# Patient Record
Sex: MALE | Race: WHITE | HISPANIC OR LATINO | Employment: FULL TIME | URBAN - METROPOLITAN AREA
[De-identification: names, ages, dates, MRNs, and addresses within clinical notes are randomized per-mention and may not be internally consistent; named-entity substitution may affect disease eponyms.]

---

## 2023-02-07 ENCOUNTER — OFFICE VISIT (OUTPATIENT)
Dept: FAMILY MEDICINE CLINIC | Facility: CLINIC | Age: 48
End: 2023-02-07
Payer: COMMERCIAL

## 2023-02-07 VITALS
SYSTOLIC BLOOD PRESSURE: 116 MMHG | DIASTOLIC BLOOD PRESSURE: 70 MMHG | WEIGHT: 204 LBS | OXYGEN SATURATION: 99 % | RESPIRATION RATE: 18 BRPM | HEART RATE: 89 BPM

## 2023-02-07 DIAGNOSIS — E34.9 HORMONE IMBALANCE: ICD-10-CM

## 2023-02-07 DIAGNOSIS — Z51.81 THERAPEUTIC DRUG MONITORING: ICD-10-CM

## 2023-02-07 DIAGNOSIS — F64.0 GENDER DYSPHORIA IN ADULT: Primary | ICD-10-CM

## 2023-02-07 PROCEDURE — 99203 OFFICE O/P NEW LOW 30 MIN: CPT | Performed by: FAMILY MEDICINE

## 2023-02-07 RX ORDER — ESTRADIOL 0.1 MG/D
1 FILM, EXTENDED RELEASE TRANSDERMAL 2 TIMES WEEKLY
Qty: 8 EACH | Refills: 2 | Status: SHIPPED | OUTPATIENT
Start: 2023-02-09

## 2023-02-07 NOTE — PATIENT INSTRUCTIONS
"Transfemme Resources    [facility] Transgender Support Group - Facebook group and Discord     [employer] for Transgender Health, Standards of Care  https://www.wpath.org/publications/soc      [facility], Transgender Health - http://[email or web address].[email or web address]/guidelines      [facility] - [email or web address]    [facility] - [email or web address]     Parents, Families, and Friends of [facility] and [place] - https://www.pflag.org/ourtranslovedones  - good document for friends/family who need to learn the basics     [facility] [place] - https://transequality.org/documents  - helps with name change, gender marker change, etc, is state specific     * * * * * *    Insurance will typically cover hormones, but if they don't, or if you need to pay out of pocket, use the following site.    www.[employer]el[employer]     Can get injection supplies online - "[employer]. Recommended supplies as below:     1 ml, Luer-Adi Syringes      18 G x 1.5” hypodermic needles for drawing up      25 G x 5/8” or 27 G 1/2\" hypodermic needles for injecting      [facility] Injection Guide -   https://[employer].org/wp-content/uploads/MG-6_TransHealth_InjectionGuide.pdf     Injection supplies:  Syringes   Needles   Alcohol swabs  Cotton balls/band-aids  Sharps container       "

## 2023-02-07 NOTE — PROGRESS NOTES
Chief Complaint  Annual Exam and Establish Care    Subjective    History of Present Illness {CC  Problem List  Visit  Diagnosis   Encounters  Notes  Medications  Labs  Result Review Imaging  Media :23}     Riley Stearns presents to [facility] for Annual Exam and Establish Care.  History of Present Illness     Here today to establish care and to discuss gender dysphoria.    Has identified as trans for quite some time now. Was born in [place], moved to [place] in his mid 20s. Has been reading extensively about gender affirming hormone therapy for at least the past 7 years. Currently dating a trans woman and started on estradiol patches about 3 months ago. Interested in getting labs today and continuing therapy.    Interested in remaining generally androgenous if possible. Understands some of the limitations of this. Had some breast growth in adolescence, noticed some breast tenderness with initial estradiol therapy. Would like to avoid this if possible.    Healthy otherwise. No other medications at present. Has good family and social support.    Objective     Vital Signs:   /70   Pulse 89   Resp 18   Wt 92.5 kg (204 lb)   SpO2 99%   Physical Exam  Vitals and nursing note reviewed.   Constitutional:       General: He is not in acute distress.     Appearance: Normal appearance. He is not ill-appearing.   Cardiovascular:      Rate and Rhythm: Normal rate and regular rhythm.      Pulses: Normal pulses.      Heart sounds: Normal heart sounds. No murmur heard.  Pulmonary:      Effort: Pulmonary effort is normal. No respiratory distress.      Breath sounds: Normal breath sounds. No rales.   Neurological:      Mental Status: He is alert and oriented to person, place, and time. Mental status is at baseline.   Psychiatric:         Mood and Affect: Mood normal.         Behavior: Behavior normal.          Result Review  Data Reviewed:{ Labs  Result Review  Imaging  Med Tab   Media :23}                   Assessment and Plan {CC Problem List  Visit Diagnosis  ROS  Review (Popup)  Health Maintenance  Quality  BestPractice  Medications  SmartSets  SnapShot Encounters  Media :23}   Diagnoses and all orders for this visit:    1. Gender dysphoria in adult (Primary)  -     estradiol (VIVELLE-DOT) 0.1 MG/24HR patch; Place 1 patch on the skin as directed by provider 2 (Two) Times a Week.  Dispense: 8 each; Refill: 2  -     Estradiol  -     Estrone  -     Testosterone  -     Lipid Panel With LDL / HDL Ratio  -     Comprehensive Metabolic Panel    2. Hormone imbalance  -     estradiol (VIVELLE-DOT) 0.1 MG/24HR patch; Place 1 patch on the skin as directed by provider 2 (Two) Times a Week.  Dispense: 8 each; Refill: 2  -     Estradiol  -     Estrone  -     Testosterone  -     Lipid Panel With LDL / HDL Ratio  -     Comprehensive Metabolic Panel    3. Therapeutic drug monitoring  -     Estradiol  -     Estrone  -     Testosterone  -     Lipid Panel With LDL / HDL Ratio  -     Comprehensive Metabolic Panel    Established gender dysphoria, hormonal imbalance in a transgender patient with a strong desire for gender affirming hormone therapy.     Gender identity is consistent, persistent, and insistent. Discussed risks, benefits, alternatives, potential side effects of therapy, and typical follow up. Resources provided including [facility] Standards of Care, PFLAG “Our Trans Loved Ones”, and local support groups.     Meds as above. Will check labs today and adjust as needed.    >50% of this 30 min visit spent in counseling about gender-affirming hormone therapy.    Patient was given instructions and counseling regarding his condition or for health maintenance advice. Please see specific information pulled into the AVS (placed there by myself) if appropriate.    Return in about 3 months (around 5/7/2023), or if symptoms worsen or fail to improve, for f/u gender-affirming hormone therapy.      TERRANCE Crystal  MD Marlon

## 2023-02-08 LAB
ALBUMIN SERPL-MCNC: 4.7 G/DL (ref 3.5–5.2)
ALBUMIN/GLOB SERPL: 1.8 G/DL
ALP SERPL-CCNC: 40 U/L (ref 39–117)
ALT SERPL-CCNC: 44 U/L (ref 1–41)
AST SERPL-CCNC: 28 U/L (ref 1–40)
BILIRUB SERPL-MCNC: 0.5 MG/DL (ref 0–1.2)
BUN SERPL-MCNC: 23 MG/DL (ref 6–20)
BUN/CREAT SERPL: 19.8 (ref 7–25)
CALCIUM SERPL-MCNC: 9.6 MG/DL (ref 8.6–10.5)
CHLORIDE SERPL-SCNC: 99 MMOL/L (ref 98–107)
CHOLEST SERPL-MCNC: 252 MG/DL (ref 0–200)
CO2 SERPL-SCNC: 26.1 MMOL/L (ref 22–29)
CREAT SERPL-MCNC: 1.16 MG/DL (ref 0.76–1.27)
EGFRCR SERPLBLD CKD-EPI 2021: 78.2 ML/MIN/1.73
ESTRADIOL SERPL-MCNC: 43.8 PG/ML (ref 7.6–42.6)
ESTRONE SERPL-MCNC: 66 PG/ML (ref 0–174)
GLOBULIN SER CALC-MCNC: 2.6 GM/DL
GLUCOSE SERPL-MCNC: 99 MG/DL (ref 65–99)
HDLC SERPL-MCNC: 50 MG/DL (ref 40–60)
LDLC SERPL CALC-MCNC: 167 MG/DL (ref 0–100)
LDLC/HDLC SERPL: 3.29 {RATIO}
POTASSIUM SERPL-SCNC: 4.1 MMOL/L (ref 3.5–5.2)
PROT SERPL-MCNC: 7.3 G/DL (ref 6–8.5)
SODIUM SERPL-SCNC: 139 MMOL/L (ref 136–145)
TESTOST SERPL-MCNC: 58 NG/DL (ref 264–916)
TRIGL SERPL-MCNC: 188 MG/DL (ref 0–150)
VLDLC SERPL CALC-MCNC: 35 MG/DL (ref 5–40)

## 2023-02-10 ENCOUNTER — PATIENT ROUNDING (BHMG ONLY) (OUTPATIENT)
Dept: FAMILY MEDICINE CLINIC | Facility: CLINIC | Age: 48
End: 2023-02-10
Payer: COMMERCIAL

## 2023-02-10 NOTE — PROGRESS NOTES
A [employer] message has been sent to the patient for PATIENT ROUNDING with [facility].

## 2023-05-01 ENCOUNTER — OFFICE VISIT (OUTPATIENT)
Dept: FAMILY MEDICINE CLINIC | Facility: CLINIC | Age: 48
End: 2023-05-01
Payer: COMMERCIAL

## 2023-05-01 VITALS
DIASTOLIC BLOOD PRESSURE: 70 MMHG | HEART RATE: 72 BPM | WEIGHT: 204 LBS | RESPIRATION RATE: 18 BRPM | OXYGEN SATURATION: 98 % | SYSTOLIC BLOOD PRESSURE: 120 MMHG

## 2023-05-01 DIAGNOSIS — E34.9 HORMONE IMBALANCE: ICD-10-CM

## 2023-05-01 DIAGNOSIS — F64.0 GENDER DYSPHORIA IN ADULT: Primary | ICD-10-CM

## 2023-05-01 DIAGNOSIS — Z51.81 THERAPEUTIC DRUG MONITORING: ICD-10-CM

## 2023-05-01 NOTE — PROGRESS NOTES
Chief Complaint  Gender Dysphoria in Adult    Subjective    History of Present Illness {CC  Problem List  Visit  Diagnosis   Encounters  Notes  Medications  Labs  Result Review Imaging  Media :23}     Riley Stearns presents to [facility] for Gender Dysphoria in Adult.  History of Present Illness     Here today for follow-up as above. Has increased the estradiol dose since our last visit, will be interested to see what labs look like. Very happy with the changes that have been seen so far, no unwanted changes. Reports good adherence and tolerance overall. No need for refills at this time.    Objective     Vital Signs:   /70   Pulse 72   Resp 18   Wt 92.5 kg (204 lb)   SpO2 98%   Physical Exam  Vitals and nursing note reviewed.   Constitutional:       General: Riley Stearns is not in acute distress.     Appearance: Normal appearance. Riley Stearns is not ill-appearing.   Cardiovascular:      Rate and Rhythm: Normal rate and regular rhythm.      Pulses: Normal pulses.      Heart sounds: Normal heart sounds. No murmur heard.  Pulmonary:      Effort: Pulmonary effort is normal. No respiratory distress.      Breath sounds: Normal breath sounds. No rales.   Neurological:      Mental Status: Riley Stearns is alert and oriented to person, place, and time. Mental status is at baseline.   Psychiatric:         Mood and Affect: Mood normal.         Behavior: Behavior normal.          Result Review  Data Reviewed:{ Labs  Result Review  Imaging  Med Tab  Media :23}                   Assessment and Plan {CC Problem List  Visit Diagnosis  ROS  Review (Popup)  Health Maintenance  Quality  BestPractice  Medications  SmartSets  SnapShot Encounters  Media :23}   Diagnoses and all orders for this visit:    1. Gender dysphoria in adult (Primary)  -     Estradiol  -     Estrone  -     Testosterone  -     Lipid Panel With LDL / HDL Ratio  -     Comprehensive Metabolic Panel    2.  Hormone imbalance  -     Estradiol  -     Estrone  -     Testosterone  -     Lipid Panel With LDL / HDL Ratio  -     Comprehensive Metabolic Panel    3. Therapeutic drug monitoring  -     Estradiol  -     Estrone  -     Testosterone  -     Lipid Panel With LDL / HDL Ratio  -     Comprehensive Metabolic Panel    Orders as above. Will come in sometime this week for labs I will contact them with results as available. Continue regimen as prescribed for now.    Recommended follow-up as below. Encouraged communication via [employer] in the meantime.          Patient was given instructions and counseling regarding Riley Stearns's condition or for health maintenance advice. Please see specific information pulled into the AVS (placed there by myself) if appropriate.    Return in about 3 months (around 8/1/2023), or if symptoms worsen or fail to improve, for f/u gender-affirming hormone therapy.      TERRANCE Mason MD

## 2023-05-06 LAB
ALBUMIN SERPL-MCNC: 4.7 G/DL (ref 3.5–5.2)
ALBUMIN/GLOB SERPL: 1.7 G/DL
ALP SERPL-CCNC: 41 U/L (ref 39–117)
ALT SERPL-CCNC: 34 U/L (ref 1–41)
AST SERPL-CCNC: 23 U/L (ref 1–40)
BILIRUB SERPL-MCNC: 0.3 MG/DL (ref 0–1.2)
BUN SERPL-MCNC: 15 MG/DL (ref 6–20)
BUN/CREAT SERPL: 14 (ref 7–25)
CALCIUM SERPL-MCNC: 9.9 MG/DL (ref 8.6–10.5)
CHLORIDE SERPL-SCNC: 101 MMOL/L (ref 98–107)
CHOLEST SERPL-MCNC: 266 MG/DL (ref 0–200)
CO2 SERPL-SCNC: 21.8 MMOL/L (ref 22–29)
CREAT SERPL-MCNC: 1.07 MG/DL (ref 0.76–1.27)
EGFRCR SERPLBLD CKD-EPI 2021: 85.6 ML/MIN/1.73
ESTRADIOL SERPL-MCNC: 24.2 PG/ML (ref 7.6–42.6)
GLOBULIN SER CALC-MCNC: 2.8 GM/DL
GLUCOSE SERPL-MCNC: 106 MG/DL (ref 65–99)
HDLC SERPL-MCNC: 44 MG/DL (ref 40–60)
LDLC SERPL CALC-MCNC: 152 MG/DL (ref 0–100)
LDLC/HDLC SERPL: 3.35 {RATIO}
POTASSIUM SERPL-SCNC: 4.3 MMOL/L (ref 3.5–5.2)
PROT SERPL-MCNC: 7.5 G/DL (ref 6–8.5)
SODIUM SERPL-SCNC: 139 MMOL/L (ref 136–145)
TESTOST SERPL-MCNC: 84 NG/DL (ref 264–916)
TRIGL SERPL-MCNC: 372 MG/DL (ref 0–150)
VLDLC SERPL CALC-MCNC: 70 MG/DL (ref 5–40)

## 2023-05-10 LAB — ESTRONE SERPL-MCNC: 48 PG/ML (ref 0–174)

## 2023-05-28 DIAGNOSIS — E34.9 HORMONE IMBALANCE: ICD-10-CM

## 2023-05-28 DIAGNOSIS — F64.0 GENDER DYSPHORIA IN ADULT: ICD-10-CM

## 2023-05-28 RX ORDER — ESTRADIOL 0.1 MG/D
1 FILM, EXTENDED RELEASE TRANSDERMAL 2 TIMES WEEKLY
Qty: 8 EACH | Refills: 2 | Status: CANCELLED | OUTPATIENT
Start: 2023-05-29

## 2023-05-29 DIAGNOSIS — F64.0 GENDER DYSPHORIA IN ADULT: ICD-10-CM

## 2023-05-29 DIAGNOSIS — E34.9 HORMONE IMBALANCE: ICD-10-CM

## 2023-05-29 RX ORDER — ESTRADIOL 0.1 MG/D
1 FILM, EXTENDED RELEASE TRANSDERMAL 2 TIMES WEEKLY
Qty: 24 EACH | Refills: 1 | Status: SHIPPED | OUTPATIENT
Start: 2023-05-29

## 2023-08-14 ENCOUNTER — OFFICE VISIT (OUTPATIENT)
Dept: FAMILY MEDICINE CLINIC | Facility: CLINIC | Age: 48
End: 2023-08-14
Payer: COMMERCIAL

## 2023-08-14 VITALS
OXYGEN SATURATION: 97 % | HEART RATE: 81 BPM | SYSTOLIC BLOOD PRESSURE: 116 MMHG | DIASTOLIC BLOOD PRESSURE: 68 MMHG | RESPIRATION RATE: 18 BRPM | WEIGHT: 206 LBS

## 2023-08-14 DIAGNOSIS — Z51.81 THERAPEUTIC DRUG MONITORING: ICD-10-CM

## 2023-08-14 DIAGNOSIS — R19.5 ACHOLIC STOOL: ICD-10-CM

## 2023-08-14 DIAGNOSIS — R10.11 POSTPRANDIAL RUQ PAIN: Primary | ICD-10-CM

## 2023-08-14 DIAGNOSIS — E34.9 HORMONE IMBALANCE: ICD-10-CM

## 2023-08-14 DIAGNOSIS — F64.0 GENDER DYSPHORIA IN ADULT: ICD-10-CM

## 2023-08-14 PROCEDURE — 99214 OFFICE O/P EST MOD 30 MIN: CPT | Performed by: FAMILY MEDICINE

## 2023-08-14 NOTE — PROGRESS NOTES
Chief Complaint  Gender Dysphoria in Adult  and Abdominal Pain (With white stool, RUQ pain x last Wednesday )    Subjective    History of Present Illness {CC  Problem List  Visit  Diagnosis   Encounters  Notes  Medications  Labs  Result Review Imaging  Media :23}     Riley Stearns presents to [facility] for Gender Dysphoria in Adult  and Abdominal Pain (With white stool, RUQ pain x last Wednesday ).  History of Present Illness     Here today with concerns as above. Due for check of hormone labs. Is currently using a half patch twice weekly. Generally happy with the changes that have been seen.    Has had some recent right upper quadrant discomfort. Is been going on for several months but then had an acute episode several weeks ago. This was accompanied by some acholic stool. Now has discomfort with any fatty meal. Has not had imaging or further work-up. No fevers or chills. Weight is up somewhat with the estradiol therapy, believes that it is time to change up diet.    Objective     Vital Signs:   /68   Pulse 81   Resp 18   Wt 93.4 kg (206 lb)   SpO2 97%   Physical Exam  Vitals and nursing note reviewed.   Constitutional:       General: Riley Stearns is not in acute distress.     Appearance: Normal appearance. Riley Stearns is not ill-appearing.   Cardiovascular:      Rate and Rhythm: Normal rate and regular rhythm.      Pulses: Normal pulses.      Heart sounds: Normal heart sounds. No murmur heard.  Pulmonary:      Effort: Pulmonary effort is normal. No respiratory distress.      Breath sounds: Normal breath sounds. No rales.   Abdominal:      General: Abdomen is flat. Bowel sounds are normal.      Palpations: Abdomen is soft.      Tenderness:  in the right upper quadrant There is no guarding or rebound. Positive signs include Badillo's sign.   Neurological:      Mental Status: Riley Stearns is alert and oriented to person, place, and time. Mental status is at baseline.    Psychiatric:         Mood and Affect: Mood normal.         Behavior: Behavior normal.        Result Review  Data Reviewed:{ Labs  Result Review  Imaging  Med Tab  Media :23}                   Assessment and Plan {CC Problem List  Visit Diagnosis  ROS  Review (Popup)  Health Maintenance  Quality  BestPractice  Medications  SmartSets  SnapShot Encounters  Media :23}   Diagnoses and all orders for this visit:    1. Postprandial RUQ pain (Primary)  -     Comprehensive Metabolic Panel  -     US abdomen limited; Future  -     Ambulatory Referral to General Surgery    2. Acholic stool  -     Comprehensive Metabolic Panel  -     US abdomen limited; Future  -     Ambulatory Referral to General Surgery    3. Gender dysphoria in adult  -     Estradiol  -     Estrone  -     Testosterone  -     Lipid Panel With LDL / HDL Ratio    4. Hormone imbalance  -     Estradiol  -     Estrone  -     Testosterone  -     Lipid Panel With LDL / HDL Ratio    5. Therapeutic drug monitoring  -     Estradiol  -     Estrone  -     Testosterone  -     Lipid Panel With LDL / HDL Ratio    Orders as above. Strongly suspect chronic cholecystitis and/or cholelithiasis. We will get imaging, labs, and refer to general surgery as above. Discussed signs and symptoms that would warrant more urgent eval. I will contact them with results as available.    Recommended follow-up as below. Encouraged communication via [employer] in the meantime.    Patient was given instructions and counseling regarding Riley Stearns's condition or for health maintenance advice. Please see specific information pulled into the AVS (placed there by myself) if appropriate.    Return in about 6 months (around 2/14/2024), or if symptoms worsen or fail to improve, for f/u gender-affirming hormone therapy, Preventive Health Maintenance.      TERRANCE Mason MD

## 2023-08-15 LAB
ALBUMIN SERPL-MCNC: 4.8 G/DL (ref 4.1–5.1)
ALBUMIN/GLOB SERPL: 1.8 {RATIO} (ref 1.2–2.2)
ALP SERPL-CCNC: 39 IU/L (ref 44–121)
ALT SERPL-CCNC: 24 IU/L (ref 0–44)
AST SERPL-CCNC: 20 IU/L (ref 0–40)
BILIRUB SERPL-MCNC: 0.4 MG/DL (ref 0–1.2)
BUN SERPL-MCNC: 16 MG/DL (ref 6–24)
BUN/CREAT SERPL: 13 (ref 9–20)
CALCIUM SERPL-MCNC: 9.3 MG/DL (ref 8.7–10.2)
CHLORIDE SERPL-SCNC: 100 MMOL/L (ref 96–106)
CHOLEST SERPL-MCNC: 224 MG/DL (ref 100–199)
CO2 SERPL-SCNC: 22 MMOL/L (ref 20–29)
CREAT SERPL-MCNC: 1.27 MG/DL (ref 0.76–1.27)
EGFRCR SERPLBLD CKD-EPI 2021: 70 ML/MIN/1.73
ESTRADIOL SERPL-MCNC: 66 PG/ML (ref 7.6–42.6)
GLOBULIN SER CALC-MCNC: 2.6 G/DL (ref 1.5–4.5)
GLUCOSE SERPL-MCNC: 98 MG/DL (ref 70–99)
HDLC SERPL-MCNC: 39 MG/DL
LDLC SERPL CALC-MCNC: 132 MG/DL (ref 0–99)
LDLC/HDLC SERPL: 3.4 RATIO (ref 0–3.6)
POTASSIUM SERPL-SCNC: 4.2 MMOL/L (ref 3.5–5.2)
PROT SERPL-MCNC: 7.4 G/DL (ref 6–8.5)
SODIUM SERPL-SCNC: 138 MMOL/L (ref 134–144)
TESTOST SERPL-MCNC: 198 NG/DL (ref 264–916)
TRIGL SERPL-MCNC: 298 MG/DL (ref 0–149)
VLDLC SERPL CALC-MCNC: 53 MG/DL (ref 5–40)

## 2023-08-17 LAB — ESTRONE SERPL-MCNC: 70 PG/ML (ref 0–174)

## 2023-08-18 ENCOUNTER — TELEPHONE (OUTPATIENT)
Dept: FAMILY MEDICINE CLINIC | Facility: CLINIC | Age: 48
End: 2023-08-18

## 2023-08-18 NOTE — TELEPHONE ENCOUNTER
Caller: Riley Stearns    Relationship: Self    Best call back number: [phone]    What orders are you requesting (i.e. lab or imaging): ABDOMINAL ULTRASOUND     Where will you receive your lab/imaging services: [facility] [place] [address]                                                                               FAX NUMBER: [phone]    Additional notes: PATIENT STATES HE IS WANTING HIS ABDOMINAL ULTRASOUND ORDER SENT TO     PATIENT IS REQUESTING A CALL BACK WHEN THE ORDER HAS BEEN SENT.      FAX NUMBER: [phone]

## 2023-08-23 ENCOUNTER — TELEPHONE (OUTPATIENT)
Dept: FAMILY MEDICINE CLINIC | Facility: CLINIC | Age: 48
End: 2023-08-23

## 2023-08-23 DIAGNOSIS — R10.11 POSTPRANDIAL RUQ PAIN: Primary | ICD-10-CM

## 2023-08-23 DIAGNOSIS — R19.5 ACHOLIC STOOL: ICD-10-CM

## 2023-08-23 NOTE — TELEPHONE ENCOUNTER
Caller: Riley Stearns    Relationship to patient: Self    Best call back number:      Patient is needing: PATIENT STATES THAT HE WAS SCHEDULED FOR AN ABDOMINAL ULTRASOUND THAT WILL COST HIM $500, BUT HE WAS TOLD IF HE GOES TO A DIFFERENT FACILITY IT WILL BE COVERED BY HIS INSURANCE.  PATIENT WOULD LIKE A REFERRAL FOR THE ULTRASOUND TO BE SENT TO [facility].     PLEASE ADVISE THE PATIENT ONCE THIS ORDER/REFERRAL HAS BEEN PLACED.

## 2023-08-29 ENCOUNTER — OFFICE VISIT (OUTPATIENT)
Dept: SURGERY | Facility: CLINIC | Age: 48
End: 2023-08-29
Payer: COMMERCIAL

## 2023-08-29 VITALS
HEIGHT: 75 IN | WEIGHT: 202.8 LBS | DIASTOLIC BLOOD PRESSURE: 64 MMHG | SYSTOLIC BLOOD PRESSURE: 104 MMHG | BODY MASS INDEX: 25.22 KG/M2

## 2023-08-29 DIAGNOSIS — R19.5 ACHOLIC STOOL: ICD-10-CM

## 2023-08-29 DIAGNOSIS — R10.11 RUQ ABDOMINAL PAIN: Primary | ICD-10-CM

## 2023-08-29 PROCEDURE — 99203 OFFICE O/P NEW LOW 30 MIN: CPT | Performed by: SURGERY

## 2023-09-03 NOTE — PROGRESS NOTES
General Surgery  Initial Office Visit    CC: Abdominal pain    HPI: The patient is a pleasant 48 y.o. year-old patient who presents today for evaluation of postprandial RUQ abdominal pain that is described as cramping with associated abdominal bloating.  Their symptoms are chronic and have been ongoing for a few years.  The pain is always made worse whenever they eat fatty foods such as pizza or chicken wings.  2 weeks ago they had a particularly bad episode and after the pain resolved their stools were white and pasty.  Their urine at that time was also darker in color.  They did not experience any jaundice or scleral icterus.  They mentioned their symptoms to Dr. Mason, and was referred to see me to evaluate if the gallbladder might be abnormal/inflamed.  They have had no imaging work-up, specifically no HIDA scan or gallbladder ultrasound.    Past Medical History:   Gender dysphoria    Past Surgical History:   Umbilical hernia repair  Left inguinal hernia repair  Tonsillectomy    Medications:   Estradiol patch changed twice weekly    Allergies: No known drug allergies    Family History: Father with history of alcohol abuse, mother with history of breast cancer    Social History: Single, works for [employer], non-smoker, social alcohol use    ROS:  Constitutional: Negative for fevers or chills  HENT: Negative for hearing loss or runny nose  Eyes: Negative for vision changes or scleral icterus  Respiratory: Negative for cough or shortness of breath  Cardiovascular: Negative for chest pain or heart palpitations  Gastrointestinal: Positive for abdominal pain, abdominal bloating, and pasty/pale stool; negative for nausea, vomiting, constipation, melena, or hematochezia  Genitourinary: Positive for darker urine transiently; negative for hematuria or dysuria  Musculoskeletal: Negative for joint swelling or gait instability  Neurologic: Negative for tremors or seizures  Psychiatric: Negative for suicidal ideations or  agitation  All other systems reviewed and negative    Physical Exam:  Height: 190 cm  Weight: 92 kg   BMI: 25.35  General: No acute distress, well-nourished & well-developed  HEAD: normocephalic, atraumatic  EYES: normal conjunctiva, sclera anicteric  EARS: grossly normal hearing  NECK: supple, no thyromegaly  CARDIOVASCULAR: regular rate and rhythm  RESPIRATORY: clear to auscultation bilaterally  GASTROINTESTINAL: soft, nontender, non-distended  MUSCULOSKELETAL: normal gait and station. No gross extremity abnormalities  PSYCHIATRIC: oriented x3, normal mood and affect    IMAGING:  None    ASSESSMENT & PLAN  Riley Stearns is a 48-year-old patient with postprandial right upper quadrant abdominal pain suggesting a biliary pathology.  I have ordered a gallbladder ultrasound and HIDA scan to be performed to assess for biliary dyskinesia, gallstones, and possible choledocholithiasis given the recent change in stool and urine coloring.  I will call the patient with the results of the radiology tests, and if there are any abnormalities I would recommend proceeding with laparoscopic cholecystectomy and cholangiogram in the near future.  They expressed understanding and will await my call.    Ivone Polo MD  General, Robotic, and Endoscopic Surgery  [facility]    [address], [address]  [place] [address]  P: [phone]  F: [phone]

## 2023-09-12 ENCOUNTER — HOSPITAL ENCOUNTER (OUTPATIENT)
Dept: ULTRASOUND IMAGING | Facility: HOSPITAL | Age: 48
Discharge: HOME | End: 2023-09-12
Admitting: SURGERY
Payer: COMMERCIAL

## 2023-09-12 ENCOUNTER — HOSPITAL ENCOUNTER (OUTPATIENT)
Dept: NUCLEAR MEDICINE | Facility: HOSPITAL | Age: 48
Discharge: HOME | End: 2023-09-12
Payer: COMMERCIAL

## 2023-09-12 ENCOUNTER — PREP FOR SURGERY (OUTPATIENT)
Dept: OTHER | Facility: HOSPITAL | Age: 48
End: 2023-09-12
Payer: COMMERCIAL

## 2023-09-12 ENCOUNTER — TELEPHONE (OUTPATIENT)
Dept: SURGERY | Facility: CLINIC | Age: 48
End: 2023-09-12
Payer: COMMERCIAL

## 2023-09-12 DIAGNOSIS — R10.11 RUQ ABDOMINAL PAIN: ICD-10-CM

## 2023-09-12 DIAGNOSIS — K82.8 BILIARY DYSKINESIA: ICD-10-CM

## 2023-09-12 DIAGNOSIS — R10.11 RUQ ABDOMINAL PAIN: Primary | ICD-10-CM

## 2023-09-12 DIAGNOSIS — R19.5 ACHOLIC STOOL: ICD-10-CM

## 2023-09-12 PROCEDURE — 78227 HEPATOBIL SYST IMAGE W/DRUG: CPT

## 2023-09-12 PROCEDURE — A9537 TC99M MEBROFENIN: HCPCS | Performed by: SURGERY

## 2023-09-12 PROCEDURE — 76705 ECHO EXAM OF ABDOMEN: CPT

## 2023-09-12 PROCEDURE — 0 TECHNETIUM TC 99M MEBROFENIN KIT: Performed by: SURGERY

## 2023-09-12 RX ORDER — CEFAZOLIN SODIUM 2 G/100ML
2000 INJECTION, SOLUTION INTRAVENOUS ONCE
OUTPATIENT
Start: 2023-09-12 | End: 2023-09-12

## 2023-09-12 RX ORDER — KIT FOR THE PREPARATION OF TECHNETIUM TC 99M MEBROFENIN 45 MG/10ML
1 INJECTION, POWDER, LYOPHILIZED, FOR SOLUTION INTRAVENOUS
Status: COMPLETED | OUTPATIENT
Start: 2023-09-12 | End: 2023-09-12

## 2023-09-12 RX ADMIN — MEBROFENIN 1 DOSE: 45 INJECTION, POWDER, LYOPHILIZED, FOR SOLUTION INTRAVENOUS at 12:31

## 2023-09-12 NOTE — TELEPHONE ENCOUNTER
I called the patient and discussed their HIDA scan and gallbladder ultrasound results which demonstrate no evidence for cholelithiasis or acute cholecystitis.  Their HIDA scan does show a low gallbladder ejection fraction of 35% which would account for their postprandial right upper quadrant abdominal pain and acholic stools.  I would recommend they consider undergoing a laparoscopic cholecystectomy with cholangiogram at his convenience.  I discussed the risks of the surgery which include but are not limited to bleeding, wound infection, common bile duct injury, and postoperative bile leak.  Despite these risks, they have consented to proceed and would like to get it scheduled for sometime in the next few weeks.  Could someone please reach out to the patient to schedule lap roland with IOC on either a Friday or Wednesday coming up?  I am available any of the Wednesdays during September and October even if I do not have block time.  I also offered the fourth or fifth Friday of September or the fourth Friday in October.    Thanks,  DARRYN

## 2023-09-13 ENCOUNTER — TELEPHONE (OUTPATIENT)
Dept: SURGERY | Facility: CLINIC | Age: 48
End: 2023-09-13
Payer: COMMERCIAL

## 2023-11-28 DIAGNOSIS — F64.0 GENDER DYSPHORIA IN ADULT: ICD-10-CM

## 2023-11-28 DIAGNOSIS — E34.9 HORMONE IMBALANCE: ICD-10-CM

## 2023-11-28 RX ORDER — ESTRADIOL 0.1 MG/D
1 FILM, EXTENDED RELEASE TRANSDERMAL 2 TIMES WEEKLY
Qty: 24 EACH | Refills: 1 | Status: SHIPPED | OUTPATIENT
Start: 2023-11-30

## 2023-12-11 ENCOUNTER — OFFICE VISIT (OUTPATIENT)
Dept: FAMILY MEDICINE CLINIC | Facility: CLINIC | Age: 48
End: 2023-12-11
Payer: COMMERCIAL

## 2023-12-11 VITALS
HEART RATE: 78 BPM | RESPIRATION RATE: 18 BRPM | WEIGHT: 198 LBS | OXYGEN SATURATION: 99 % | HEIGHT: 75 IN | SYSTOLIC BLOOD PRESSURE: 116 MMHG | DIASTOLIC BLOOD PRESSURE: 74 MMHG | BODY MASS INDEX: 24.62 KG/M2

## 2023-12-11 DIAGNOSIS — F64.0 GENDER DYSPHORIA IN ADULT: Primary | ICD-10-CM

## 2023-12-11 DIAGNOSIS — E34.9 HORMONE IMBALANCE: ICD-10-CM

## 2023-12-11 DIAGNOSIS — Z51.81 THERAPEUTIC DRUG MONITORING: ICD-10-CM

## 2023-12-11 DIAGNOSIS — Z11.59 ENCOUNTER FOR HEPATITIS C SCREENING TEST FOR LOW RISK PATIENT: ICD-10-CM

## 2023-12-11 NOTE — PROGRESS NOTES
"Chief Complaint  Gender Dysphoria in Adult    Subjective    History of Present Illness {CC  Problem List  Visit  Diagnosis   Encounters  Notes  Medications  Labs  Result Review Imaging  Media :23}     Riley Stearns presents to [facility] for Gender Dysphoria in Adult.  History of Present Illness     Here today for follow-up as above. Has been doing quite well on estradiol patches twice weekly. Feels that mood is overall stable and has seen some changes that have been quite welcomed. No unwanted changes. No need for refill today. Is due for some hormone level labs. Also due for hepatitis C screening. Is low risk with no concerning risk factors.    Objective     Vital Signs:   /74   Pulse 78   Resp 18   Ht 190.5 cm (75\")   Wt 89.8 kg (198 lb)   SpO2 99%   BMI 24.75 kg/m²   Physical Exam  Vitals and nursing note reviewed.   Constitutional:       General: Riley Stearns is not in acute distress.     Appearance: Normal appearance. Riley Stearns is not ill-appearing.   Cardiovascular:      Rate and Rhythm: Normal rate and regular rhythm.      Pulses: Normal pulses.      Heart sounds: Normal heart sounds. No murmur heard.  Pulmonary:      Effort: Pulmonary effort is normal. No respiratory distress.      Breath sounds: Normal breath sounds. No rales.   Neurological:      Mental Status: Riley Stearns is alert and oriented to person, place, and time. Mental status is at baseline.   Psychiatric:         Mood and Affect: Mood normal.         Behavior: Behavior normal.          Result Review  Data Reviewed:{ Labs  Result Review  Imaging  Med Tab  Media :23}                   Assessment and Plan {CC Problem List  Visit Diagnosis  ROS  Review (Popup)  Health Maintenance  Quality  BestPractice  Medications  SmartSets  SnapShot Encounters  Media :23}   Diagnoses and all orders for this visit:    1. Gender dysphoria in adult (Primary)  -     Estradiol  -     Estrone  -     " Testosterone  -     Comprehensive Metabolic Panel  -     Progesterone  -     Lipid Panel    2. Hormone imbalance  -     Estradiol  -     Estrone  -     Testosterone  -     Comprehensive Metabolic Panel  -     Progesterone  -     Lipid Panel    3. Therapeutic drug monitoring  -     Estradiol  -     Estrone  -     Testosterone  -     Comprehensive Metabolic Panel  -     Progesterone  -     Lipid Panel    4. Encounter for hepatitis C screening test for low risk patient  -     Hepatitis C Antibody    Orders as above. I will contact them with results as available. Continue regimen as prescribed. Will let me know as refills are needed.    Recommended follow up as below. Encouraged communication via [employer] in the meantime.     Patient was given instructions and counseling regarding Riley Stearns's condition or for health maintenance advice. Please see specific information pulled into the AVS (placed there by myself) if appropriate.    Return in about 6 months (around 6/11/2024), or if symptoms worsen or fail to improve, for f/u gender-affirming hormone therapy.    TERRANCE Mason MD

## 2023-12-12 LAB
CHOLEST SERPL-MCNC: 263 MG/DL (ref 0–200)
ESTRADIOL SERPL-MCNC: 124 PG/ML (ref 7.6–42.6)
ESTRONE SERPL-MCNC: 87 PG/ML (ref 0–174)
HCV IGG SERPL QL IA: NON REACTIVE
HDLC SERPL-MCNC: 54 MG/DL (ref 40–60)
LDLC SERPL CALC-MCNC: 188 MG/DL (ref 0–100)
PROGEST SERPL-MCNC: 0.2 NG/ML (ref 0–0.5)
TESTOST SERPL-MCNC: 32 NG/DL (ref 264–916)
TRIGL SERPL-MCNC: 119 MG/DL (ref 0–150)
VLDLC SERPL CALC-MCNC: 21 MG/DL (ref 5–40)

## 2023-12-13 LAB
ALBUMIN SERPL-MCNC: 4.9 G/DL (ref 3.5–5.2)
ALBUMIN/GLOB SERPL: 1.8 G/DL
ALP SERPL-CCNC: 33 U/L (ref 39–117)
ALT SERPL-CCNC: 24 U/L (ref 1–41)
AST SERPL-CCNC: 24 U/L (ref 1–40)
BILIRUB SERPL-MCNC: 0.5 MG/DL (ref 0–1.2)
BUN SERPL-MCNC: 12 MG/DL (ref 6–20)
BUN/CREAT SERPL: 12.6 (ref 7–25)
CALCIUM SERPL-MCNC: 10 MG/DL (ref 8.6–10.5)
CHLORIDE SERPL-SCNC: 104 MMOL/L (ref 98–107)
CO2 SERPL-SCNC: 16.5 MMOL/L (ref 22–29)
CREAT SERPL-MCNC: 0.95 MG/DL (ref 0.76–1.27)
EGFRCR SERPLBLD CKD-EPI 2021: 98.7 ML/MIN/1.73
GLOBULIN SER CALC-MCNC: 2.8 GM/DL
GLUCOSE SERPL-MCNC: 100 MG/DL (ref 65–99)
POTASSIUM SERPL-SCNC: 4.5 MMOL/L (ref 3.5–5.2)
PROT SERPL-MCNC: 7.7 G/DL (ref 6–8.5)
SODIUM SERPL-SCNC: 143 MMOL/L (ref 136–145)

## 2024-01-09 RX ORDER — PROGESTERONE 100 MG/1
100 CAPSULE ORAL
Qty: 90 CAPSULE | Refills: 3 | Status: SHIPPED | OUTPATIENT
Start: 2024-01-09

## 2024-03-15 DIAGNOSIS — F64.0 GENDER DYSPHORIA IN ADULT: ICD-10-CM

## 2024-03-15 DIAGNOSIS — E34.9 HORMONE IMBALANCE: ICD-10-CM

## 2024-03-18 RX ORDER — ESTRADIOL 0.1 MG/D
1 FILM, EXTENDED RELEASE TRANSDERMAL 2 TIMES WEEKLY
Qty: 24 EACH | Refills: 1 | Status: SHIPPED | OUTPATIENT
Start: 2024-03-18

## 2024-12-03 ENCOUNTER — OFFICE VISIT (OUTPATIENT)
Dept: FAMILY MEDICINE CLINIC | Facility: CLINIC | Age: 49
End: 2024-12-03
Payer: COMMERCIAL

## 2024-12-03 ENCOUNTER — PRIOR AUTHORIZATION (OUTPATIENT)
Dept: FAMILY MEDICINE CLINIC | Facility: CLINIC | Age: 49
End: 2024-12-03

## 2024-12-03 VITALS
HEIGHT: 75 IN | OXYGEN SATURATION: 100 % | RESPIRATION RATE: 14 BRPM | SYSTOLIC BLOOD PRESSURE: 118 MMHG | HEART RATE: 75 BPM | BODY MASS INDEX: 25.7 KG/M2 | WEIGHT: 206.7 LBS | DIASTOLIC BLOOD PRESSURE: 76 MMHG

## 2024-12-03 DIAGNOSIS — A63.0 GENITAL WARTS: ICD-10-CM

## 2024-12-03 DIAGNOSIS — F64.0 GENDER DYSPHORIA IN ADULT: Primary | ICD-10-CM

## 2024-12-03 DIAGNOSIS — E34.9 HORMONE IMBALANCE: ICD-10-CM

## 2024-12-03 DIAGNOSIS — Z51.81 THERAPEUTIC DRUG MONITORING: ICD-10-CM

## 2024-12-03 PROCEDURE — 99214 OFFICE O/P EST MOD 30 MIN: CPT | Performed by: FAMILY MEDICINE

## 2024-12-03 RX ORDER — IMIQUIMOD 37.5 MG/G
1 CREAM TOPICAL DAILY
Qty: 28 EACH | Refills: 1 | Status: SHIPPED | OUTPATIENT
Start: 2024-12-03

## 2024-12-03 RX ORDER — ESTRADIOL 0.1 MG/D
2 FILM, EXTENDED RELEASE TRANSDERMAL 2 TIMES WEEKLY
Qty: 48 PATCH | Refills: 1 | Status: SHIPPED | OUTPATIENT
Start: 2024-12-05

## 2024-12-03 RX ORDER — PROGESTERONE 100 MG/1
100 CAPSULE ORAL
Qty: 90 CAPSULE | Refills: 3 | Status: SHIPPED | OUTPATIENT
Start: 2024-12-03

## 2024-12-03 NOTE — PROGRESS NOTES
"Chief Complaint  Gender dysphoria in adult and Mass (Small mass on genitals near where patient shaves.)    Subjective    History of Present Illness  History of Present Illness  The patient presents today with concerns as above.    They have been using estradiol patches, initially at a dose of 1 twice weekly, which was increased to 1.5 twice weekly over the past month. They report that their body is adjusting well to this change, with no alterations in sex drive or other bodily functions. Hoping for a refill today. Due for a check of hormone levels.     Expresses some concern about their cholesterol levels, suspecting they may be elevated. Has made dietary changes, including eliminating milk and reducing egg consumption from three per day to once a week. They also report a significant reduction in use of oils. If levels remain elevated, they'd like to consider some meds.     Noticed some bumps in a straight line on their genitals after shaving. These bumps do not cause itching. Recalls that first wife had warts many years ago, but they did not develop any symptoms at that time. Wondering if a recent tooth infection may have weakened his immune system.      Objective     Vital Signs:   /76   Pulse 75   Resp 14   Ht 190.5 cm (75\")   Wt 93.8 kg (206 lb 11.2 oz)   SpO2 100%   BMI 25.84 kg/m²   Physical Exam  Vitals and nursing note reviewed.   Constitutional:       General: Riley is not in acute distress.     Appearance: Normal appearance. Riley is not ill-appearing.   Cardiovascular:      Rate and Rhythm: Normal rate and regular rhythm.      Pulses: Normal pulses.      Heart sounds: Normal heart sounds. No murmur heard.  Pulmonary:      Effort: Pulmonary effort is normal. No respiratory distress.      Breath sounds: Normal breath sounds. No rales.   Genitourinary:         Comments: Line of genital warts on the dorsum of the penis.   Neurological:      Mental Status: Riley is alert and oriented to person, " place, and time. Mental status is at baseline.   Psychiatric:         Mood and Affect: Mood normal.         Behavior: Behavior normal.              Assessment and Plan   Diagnoses and all orders for this visit:    1. Gender dysphoria in adult (Primary)  -     Estradiol  -     Estrone  -     Testosterone  -     Comprehensive Metabolic Panel  -     Progesterone  -     Lipid Panel  -     estradiol (VIVELLE-DOT) 0.1 MG/24HR patch; Place 2 patches on the skin as directed by provider 2 (Two) Times a Week.  Dispense: 48 patch; Refill: 1  -     Progesterone (PROMETRIUM) 100 MG capsule; Take 1 capsule by mouth every night at bedtime.  Dispense: 90 capsule; Refill: 3    2. Hormone imbalance  -     Estradiol  -     Estrone  -     Testosterone  -     Comprehensive Metabolic Panel  -     Progesterone  -     Lipid Panel  -     estradiol (VIVELLE-DOT) 0.1 MG/24HR patch; Place 2 patches on the skin as directed by provider 2 (Two) Times a Week.  Dispense: 48 patch; Refill: 1  -     Progesterone (PROMETRIUM) 100 MG capsule; Take 1 capsule by mouth every night at bedtime.  Dispense: 90 capsule; Refill: 3    3. Therapeutic drug monitoring  -     Estradiol  -     Estrone  -     Testosterone  -     Comprehensive Metabolic Panel  -     Progesterone  -     Lipid Panel    4. Genital warts  -     Imiquimod 3.75 % cream; Apply 1 Application topically Daily.  Dispense: 28 each; Refill: 1      Assessment & Plan  1. Medication refill.  A prescription for two patches to be applied twice weekly will be sent to [facility], pending insurance approval. Labs as above. I'll contact them with results as available.     2. Hypercholesterolemia.  Cholesterol levels will be checked. He has made dietary changes including eliminating milk, reducing egg consumption to once a week, and minimizing oil use. If cholesterol levels remain high, medication may be considered.    3. Genital warts.  The presence of genital warts, likely caused  by HPV, was noted. He was informed about the contagious nature of the condition and the importance of partner notification. A prescription for a cream to be applied daily for a maximum of 8 weeks will be provided. He was advised to apply a small amount to avoid local irritation. If the cream is too expensive, freezing with liquid nitrogen will be considered.    Recommended follow up as below. Encouraged communication via [employer] in the meantime.     Patient was given instructions and counseling regarding Riley's condition or for health maintenance advice. Please see specific information pulled into the AVS (placed there by myself) if appropriate.    Return in about 6 months (around 6/3/2025), or if symptoms worsen or fail to improve, for f/u gender-affirming hormone therapy.    Patient or patient representative verbalized consent for the use of Ambient Listening during the visit with  Francisco Mason MD for chart documentation. 12/3/2024  11:07 ANNELIESE Mason MD

## 2024-12-04 LAB
ALBUMIN SERPL-MCNC: 4.6 G/DL (ref 4.1–5.1)
ALP SERPL-CCNC: 33 IU/L (ref 44–121)
ALT SERPL-CCNC: 28 IU/L (ref 0–44)
AST SERPL-CCNC: 23 IU/L (ref 0–40)
BILIRUB SERPL-MCNC: 0.4 MG/DL (ref 0–1.2)
BUN SERPL-MCNC: 16 MG/DL (ref 6–24)
BUN/CREAT SERPL: 16 (ref 9–20)
CALCIUM SERPL-MCNC: 9.6 MG/DL (ref 8.7–10.2)
CHLORIDE SERPL-SCNC: 102 MMOL/L (ref 96–106)
CHOLEST SERPL-MCNC: 233 MG/DL (ref 100–199)
CO2 SERPL-SCNC: 20 MMOL/L (ref 20–29)
CREAT SERPL-MCNC: 0.97 MG/DL (ref 0.76–1.27)
EGFRCR SERPLBLD CKD-EPI 2021: 96 ML/MIN/1.73
ESTRADIOL SERPL-MCNC: 93 PG/ML (ref 7.6–42.6)
GLOBULIN SER CALC-MCNC: 2.5 G/DL (ref 1.5–4.5)
GLUCOSE SERPL-MCNC: 102 MG/DL (ref 70–99)
HDLC SERPL-MCNC: 48 MG/DL
LDLC SERPL CALC-MCNC: 149 MG/DL (ref 0–99)
POTASSIUM SERPL-SCNC: 4.4 MMOL/L (ref 3.5–5.2)
PROGEST SERPL-MCNC: 0.8 NG/ML (ref 0–0.5)
PROT SERPL-MCNC: 7.1 G/DL (ref 6–8.5)
SODIUM SERPL-SCNC: 139 MMOL/L (ref 134–144)
TESTOST SERPL-MCNC: 46 NG/DL (ref 264–916)
TRIGL SERPL-MCNC: 196 MG/DL (ref 0–149)
VLDLC SERPL CALC-MCNC: 36 MG/DL (ref 5–40)

## 2024-12-05 LAB — ESTRONE SERPL-MCNC: 126 PG/ML (ref 0–174)

## 2025-01-10 DIAGNOSIS — A63.0 GENITAL WARTS: Primary | ICD-10-CM

## 2025-01-10 RX ORDER — PODOFILOX 5 MG/G
GEL TOPICAL
Qty: 3.5 G | Refills: 1 | Status: SHIPPED | OUTPATIENT
Start: 2025-01-10

## 2025-01-19 DIAGNOSIS — F64.0 GENDER DYSPHORIA IN ADULT: ICD-10-CM

## 2025-01-19 DIAGNOSIS — E34.9 HORMONE IMBALANCE: ICD-10-CM

## 2025-01-20 RX ORDER — PROGESTERONE 100 MG/1
100 CAPSULE ORAL
Qty: 90 CAPSULE | Refills: 3 | Status: SHIPPED | OUTPATIENT
Start: 2025-01-20

## 2025-01-20 NOTE — TELEPHONE ENCOUNTER
Last office visit: 12/3/24    Next office visit: none scheduled    Last refill: 12/3/24    Last lab: 12/3/24

## 2025-03-15 DIAGNOSIS — F64.0 GENDER DYSPHORIA IN ADULT: ICD-10-CM

## 2025-03-15 DIAGNOSIS — E34.9 HORMONE IMBALANCE: ICD-10-CM

## 2025-03-17 RX ORDER — ESTRADIOL 0.1 MG/D
2 FILM, EXTENDED RELEASE TRANSDERMAL 2 TIMES WEEKLY
Qty: 48 PATCH | Refills: 1 | Status: SHIPPED | OUTPATIENT
Start: 2025-03-17

## 2025-03-17 RX ORDER — PROGESTERONE 100 MG/1
100 CAPSULE ORAL
Qty: 90 CAPSULE | Refills: 3 | Status: SHIPPED | OUTPATIENT
Start: 2025-03-17

## 2025-03-17 NOTE — TELEPHONE ENCOUNTER
Last office visit: 12/3/24    Next office visit: none scheduled    Last refill: progesterone 1/20/25    Last lab: 12/3/24

## 2025-04-25 DIAGNOSIS — F64.0 GENDER DYSPHORIA IN ADULT: ICD-10-CM

## 2025-04-25 DIAGNOSIS — E34.9 HORMONE IMBALANCE: ICD-10-CM

## 2025-04-25 NOTE — TELEPHONE ENCOUNTER
LAST REFILL - 03/17/2025  LAST VISIT - 12/03/2024  NEXT VISIT -  not scheduled    Previous prescription sent to different pharmacy.

## 2025-04-26 RX ORDER — PROGESTERONE 100 MG/1
100 CAPSULE ORAL
Qty: 90 CAPSULE | Refills: 3 | Status: SHIPPED | OUTPATIENT
Start: 2025-04-26

## 2025-07-09 DIAGNOSIS — F64.0 GENDER DYSPHORIA IN ADULT: ICD-10-CM

## 2025-07-09 DIAGNOSIS — E34.9 HORMONE IMBALANCE: ICD-10-CM

## 2025-07-10 RX ORDER — ESTRADIOL 0.1 MG/D
2 FILM, EXTENDED RELEASE TRANSDERMAL 2 TIMES WEEKLY
Qty: 48 PATCH | Refills: 1 | Status: SHIPPED | OUTPATIENT
Start: 2025-07-10

## 2025-07-28 ENCOUNTER — OFFICE VISIT (OUTPATIENT)
Dept: FAMILY MEDICINE CLINIC | Facility: CLINIC | Age: 50
End: 2025-07-28
Payer: COMMERCIAL

## 2025-07-28 VITALS
WEIGHT: 211.4 LBS | DIASTOLIC BLOOD PRESSURE: 72 MMHG | OXYGEN SATURATION: 99 % | RESPIRATION RATE: 14 BRPM | SYSTOLIC BLOOD PRESSURE: 102 MMHG | BODY MASS INDEX: 26.28 KG/M2 | HEIGHT: 75 IN | HEART RATE: 77 BPM

## 2025-07-28 DIAGNOSIS — E34.9 HORMONE IMBALANCE: ICD-10-CM

## 2025-07-28 DIAGNOSIS — Z51.81 THERAPEUTIC DRUG MONITORING: ICD-10-CM

## 2025-07-28 DIAGNOSIS — Z13.6 ENCOUNTER FOR LIPID SCREENING FOR CARDIOVASCULAR DISEASE: ICD-10-CM

## 2025-07-28 DIAGNOSIS — Z13.1 SCREENING FOR DIABETES MELLITUS: ICD-10-CM

## 2025-07-28 DIAGNOSIS — Z00.00 ROUTINE HEALTH MAINTENANCE: Primary | ICD-10-CM

## 2025-07-28 DIAGNOSIS — F64.0 GENDER DYSPHORIA IN ADULT: ICD-10-CM

## 2025-07-28 DIAGNOSIS — Z23 NEED FOR VACCINATION: ICD-10-CM

## 2025-07-28 DIAGNOSIS — Z13.220 ENCOUNTER FOR LIPID SCREENING FOR CARDIOVASCULAR DISEASE: ICD-10-CM

## 2025-07-28 DIAGNOSIS — Z12.11 SCREENING FOR COLON CANCER: ICD-10-CM

## 2025-07-28 LAB
ALBUMIN SERPL-MCNC: 4.4 G/DL (ref 3.5–5.2)
ALBUMIN/GLOB SERPL: 1.7 G/DL
ALP SERPL-CCNC: 33 U/L (ref 39–117)
ALT SERPL-CCNC: 30 U/L (ref 1–41)
AST SERPL-CCNC: 22 U/L (ref 1–40)
BILIRUB SERPL-MCNC: 0.5 MG/DL (ref 0–1.2)
BUN SERPL-MCNC: 13 MG/DL (ref 6–20)
BUN/CREAT SERPL: 12.7 (ref 7–25)
CALCIUM SERPL-MCNC: 9.5 MG/DL (ref 8.6–10.5)
CHLORIDE SERPL-SCNC: 101 MMOL/L (ref 98–107)
CHOLEST SERPL-MCNC: 242 MG/DL (ref 0–200)
CO2 SERPL-SCNC: 22.9 MMOL/L (ref 22–29)
CREAT SERPL-MCNC: 1.02 MG/DL (ref 0.76–1.27)
EGFRCR SERPLBLD CKD-EPI 2021: 89.5 ML/MIN/1.73
GLOBULIN SER CALC-MCNC: 2.6 GM/DL
GLUCOSE SERPL-MCNC: 97 MG/DL (ref 65–99)
HDLC SERPL-MCNC: 46 MG/DL (ref 40–60)
LDLC SERPL CALC-MCNC: 164 MG/DL (ref 0–100)
POTASSIUM SERPL-SCNC: 4.1 MMOL/L (ref 3.5–5.2)
PROT SERPL-MCNC: 7 G/DL (ref 6–8.5)
SODIUM SERPL-SCNC: 137 MMOL/L (ref 136–145)
TRIGL SERPL-MCNC: 177 MG/DL (ref 0–150)
VLDLC SERPL CALC-MCNC: 32 MG/DL (ref 5–40)

## 2025-07-28 PROCEDURE — 90471 IMMUNIZATION ADMIN: CPT | Performed by: FAMILY MEDICINE

## 2025-07-28 PROCEDURE — 99213 OFFICE O/P EST LOW 20 MIN: CPT | Performed by: FAMILY MEDICINE

## 2025-07-28 PROCEDURE — 99396 PREV VISIT EST AGE 40-64: CPT | Performed by: FAMILY MEDICINE

## 2025-07-28 PROCEDURE — 90684 PCV21 VACCINE IM: CPT | Performed by: FAMILY MEDICINE

## 2025-07-28 PROCEDURE — 90715 TDAP VACCINE 7 YRS/> IM: CPT | Performed by: FAMILY MEDICINE

## 2025-07-28 NOTE — PROGRESS NOTES
"Chief Complaint  Annual Exam    Subjective    History of Present Illness  History of Present Illness  The patient presents for an annual exam.    They are doing well overall, no real questions or concerns at this time. They report a recent weight gain, which they attribute to increased travel and decreased physical activity due to weather conditions. They have been making efforts to stay hydrated and take regular breaks during their short rides. They have been on hormone therapy for nearly 3 years, initially experiencing hot sweats but now feeling well-balanced with no low energy levels. They are currently on a low dose of hormones and is satisfied with the results. They do not require any medication refills at this time. They are due for a check of hormone levels today.    They are due for some vaccines today. Also due for colorectal cancer screening. They would like to have a colonoscopy ordered.    She reports no issues with bowel or bladder function, chest pain, or shortness of breath.     They report no major changes to diet. Weight is up as above.    They have good family and social support. Work is going well and stress is reasonably well-managed.      Objective     Vital Signs:   /72   Pulse 77   Resp 14   Ht 190.5 cm (75\")   Wt 95.9 kg (211 lb 6.4 oz)   SpO2 99%   BMI 26.42 kg/m²   Physical Exam  Vitals and nursing note reviewed.   Constitutional:       General: Adair is not in acute distress.     Appearance: Normal appearance. Adair is not ill-appearing.   Cardiovascular:      Rate and Rhythm: Normal rate and regular rhythm.      Pulses: Normal pulses.      Heart sounds: Normal heart sounds. No murmur heard.  Pulmonary:      Effort: Pulmonary effort is normal. No respiratory distress.      Breath sounds: Normal breath sounds. No rales.   Neurological:      Mental Status: Adair is alert and oriented to person, place, and time. Mental status is at baseline.   Psychiatric:         Mood and Affect: " Mood normal.         Behavior: Behavior normal.            Assessment and Plan   Diagnoses and all orders for this visit:    1. Routine health maintenance (Primary)    2. Gender dysphoria in adult  -     Estradiol  -     Estrone  -     Testosterone  -     Comprehensive Metabolic Panel  -     Progesterone  -     Lipid Panel    3. Hormone imbalance  -     Estradiol  -     Estrone  -     Testosterone  -     Comprehensive Metabolic Panel  -     Progesterone  -     Lipid Panel    4. Therapeutic drug monitoring  -     Estradiol  -     Estrone  -     Testosterone  -     Comprehensive Metabolic Panel  -     Progesterone  -     Lipid Panel    5. Screening for diabetes mellitus  -     Comprehensive Metabolic Panel    6. Encounter for lipid screening for cardiovascular disease  -     Lipid Panel    7. Screening for colon cancer  -     Ambulatory Referral For Screening Colonoscopy    8. Need for vaccination  -     Tdap Vaccine Greater Than or Equal To 6yo IM  -     Pneumococcal Conjugate Vaccine 21 (18+ yrs)      Assessment & Plan  1. Annual exam.  - Due for tetanus booster, pneumonia vaccine, and shingles vaccine.  - Routine blood work will be conducted today.  - Tetanus and pneumonia vaccines will be administered today; shingles vaccine will be scheduled for a later date.  - Colonoscopy will be arranged for colon cancer screening.    2. Gender dysphoria.  - Hormone levels will be checked as above. I will contact them with results as available.    BMI is >= 25 and <30. (Overweight) The following options were offered after discussion;: exercise counseling/recommendations and nutrition counseling/recommendations    Encouraged to continue working on healthy lifestyle habits. Recommended follow up as below. Encouraged communication via [employer] in the meantime.     Patient was given instructions and counseling regarding Adair's condition or for health maintenance advice. Please see specific information pulled into the AVS (placed  there by myself) if appropriate.    Return in about 1 year (around 7/28/2026), or if symptoms worsen or fail to improve, for Preventive Health Maintenance.    Patient or patient representative verbalized consent for the use of Ambient Listening during the visit with  Francisco Mason MD for chart documentation. 7/28/2025  08:07 EDT    TERRANCE Mason MD    Prevention counseling performed as below: Mindfulness for stress management.

## 2025-07-28 NOTE — PATIENT INSTRUCTIONS
Mindfulness apps: Headspace, Smiling Mind, [place]!    Mindfulness-Based Stress Reduction  Mindfulness-based stress reduction (MBSR) is a program that helps people learn to practice mindfulness. Mindfulness is the practice of intentionally paying attention to the present moment. It can be learned and practiced through techniques such as education, breathing exercises, meditation, and yoga. MBSR includes several mindfulness techniques in one program.  MBSR works best when you understand the treatment, are willing to try new things, and can commit to spending time practicing what you learn. MBSR training may include learning about:  How your emotions, thoughts, and reactions affect your body.  New ways to respond to things that cause negative thoughts to start (triggers).  How to notice your thoughts and let go of them.  Practicing awareness of everyday things that you normally do without thinking.  The techniques and goals of different types of meditation.  What are the benefits of MBSR?  MBSR can have many benefits, which include helping you to:  Develop self-awareness. This refers to knowing and understanding yourself.  Learn skills and attitudes that help you to participate in your own health care.  Learn new ways to care for yourself.  Be more accepting about how things are, and let things go.  Be less judgmental and approach things with an open mind.  Be patient with yourself and trust yourself more.  MBSR has also been shown to:  Reduce negative emotions, such as depression and anxiety.  Improve memory and focus.  Change how you sense and approach pain.  Boost your body's ability to fight infections.  Help you connect better with other people.  Improve your sense of well-being.  Follow these instructions at home:    Find a local in-person or online MBSR program.  Set aside some time regularly for mindfulness practice.  Find a mindfulness practice that works best for you. This may include one or more of the  following:  Meditation. Meditation involves focusing your mind on a certain thought or activity.  Breathing awareness exercises. These help you to stay present by focusing on your breath.  Body scan. For this practice, you lie down and pay attention to each part of your body from head to toe. You can identify tension and soreness and intentionally relax parts of your body.  Yoga. Yoga involves stretching and breathing, and it can improve your ability to move and be flexible. It can also provide an experience of testing your body's limits, which can help you release stress.  Mindful eating. This way of eating involves focusing on the taste, texture, color, and smell of each bite of food. Because this slows down eating and helps you feel full sooner, it can be an important part of a weight-loss plan.  Find a podcast or recording that provides guidance for breathing awareness, body scan, or meditation exercises. You can listen to these any time when you have a free moment to rest without distractions.  Follow your treatment plan as told by your health care provider. This may include taking regular medicines and making changes to your diet or lifestyle as recommended.  How to practice mindfulness  To do a basic awareness exercise:  Find a comfortable place to sit.  Pay attention to the present moment. Observe your thoughts, feelings, and surroundings just as they are.  Avoid placing judgment on yourself, your feelings, or your surroundings. Make note of any judgment that comes up, and let it go.  Your mind may wander, and that is okay. Make note of when your thoughts drift, and return your attention to the present moment.  To do basic mindfulness meditation:  Find a comfortable place to sit. This may include a stable chair or a firm floor cushion.  Sit upright with your back straight. Let your arms fall next to your side with your hands resting on your legs.  If sitting in a chair, rest your feet flat on the floor.  If  sitting on a cushion, cross your legs in front of you.  Keep your head in a neutral position with your chin dropped slightly. Relax your jaw and rest the tip of your tongue on the roof of your mouth. Drop your gaze to the floor. You can close your eyes if you like.  Breathe normally and pay attention to your breath. Feel the air moving in and out of your nose. Feel your belly expanding and relaxing with each breath.  Your mind may wander, and that is okay. Make note of when your thoughts drift, and return your attention to your breath.  Avoid placing judgment on yourself, your feelings, or your surroundings. Make note of any judgment or feelings that come up, let them go, and bring your attention back to your breath.  When you are ready, lift your gaze or open your eyes. Pay attention to how your body feels after the meditation.  Where to find more information  You can find more information about MBSR from:  Your health care provider.  Community-based meditation centers or programs.  Programs offered near you.  Summary  Mindfulness-based stress reduction (MBSR) is a program that teaches you how to intentionally pay attention to the present moment. It is used with other treatments to help you cope better with daily stress, emotions, and pain.  MBSR focuses on developing self-awareness, which allows you to respond to life stress without judgment or negative emotions.  MBSR programs may involve learning different mindfulness practices, such as breathing exercises, meditation, yoga, body scan, or mindful eating. Find a mindfulness practice that works best for you, and set aside time for it on a regular basis.  This information is not intended to replace advice given to you by your health care provider. Make sure you discuss any questions you have with your health care provider.  Document Released: 04/26/2018 Document Revised: 11/30/2018 Document Reviewed: 04/26/2018  [employer]vier Patient Education © 2020 [employer]

## 2025-07-29 LAB
ESTRADIOL SERPL-MCNC: 54.7 PG/ML (ref 7.6–42.6)
PROGEST SERPL-MCNC: 0.7 NG/ML (ref 0–0.5)
TESTOST SERPL-MCNC: 76 NG/DL (ref 264–916)

## 2025-07-30 LAB — ESTRONE SERPL-MCNC: 119 PG/ML (ref 0–174)

## 2025-08-22 DIAGNOSIS — E34.9 HORMONE IMBALANCE: ICD-10-CM

## 2025-08-22 DIAGNOSIS — F64.0 GENDER DYSPHORIA IN ADULT: ICD-10-CM

## 2025-08-22 RX ORDER — ESTRADIOL 0.1 MG/D
2 FILM, EXTENDED RELEASE TRANSDERMAL 2 TIMES WEEKLY
Qty: 48 PATCH | Refills: 1 | Status: SHIPPED | OUTPATIENT
Start: 2025-08-25